# Patient Record
Sex: MALE | Race: WHITE | HISPANIC OR LATINO | Employment: FULL TIME | URBAN - METROPOLITAN AREA
[De-identification: names, ages, dates, MRNs, and addresses within clinical notes are randomized per-mention and may not be internally consistent; named-entity substitution may affect disease eponyms.]

---

## 2020-02-06 ENCOUNTER — APPOINTMENT (EMERGENCY)
Dept: CT IMAGING | Facility: HOSPITAL | Age: 50
End: 2020-02-06
Payer: COMMERCIAL

## 2020-02-06 ENCOUNTER — HOSPITAL ENCOUNTER (EMERGENCY)
Facility: HOSPITAL | Age: 50
Discharge: HOME | End: 2020-02-06
Attending: EMERGENCY MEDICINE | Admitting: EMERGENCY MEDICINE
Payer: COMMERCIAL

## 2020-02-06 VITALS
HEART RATE: 57 BPM | OXYGEN SATURATION: 98 % | SYSTOLIC BLOOD PRESSURE: 141 MMHG | RESPIRATION RATE: 16 BRPM | WEIGHT: 190 LBS | BODY MASS INDEX: 28.79 KG/M2 | HEIGHT: 68 IN | TEMPERATURE: 98.5 F | DIASTOLIC BLOOD PRESSURE: 72 MMHG

## 2020-02-06 DIAGNOSIS — K52.9 COLITIS: ICD-10-CM

## 2020-02-06 DIAGNOSIS — R10.9 NONSPECIFIC ABDOMINAL PAIN: Primary | ICD-10-CM

## 2020-02-06 LAB
ALBUMIN SERPL BCP-MCNC: 4 G/DL (ref 3.5–5)
ALP SERPL-CCNC: 81 U/L (ref 46–116)
ALT SERPL W P-5'-P-CCNC: 22 U/L (ref 12–78)
ANION GAP SERPL CALCULATED.3IONS-SCNC: 9 MMOL/L (ref 4–13)
AST SERPL W P-5'-P-CCNC: 14 U/L (ref 5–45)
BASOPHILS # BLD AUTO: 0.02 THOUSANDS/ΜL (ref 0–0.1)
BASOPHILS NFR BLD AUTO: 0 % (ref 0–1)
BILIRUB DIRECT SERPL-MCNC: 0.11 MG/DL (ref 0–0.2)
BILIRUB SERPL-MCNC: 0.6 MG/DL (ref 0.2–1)
BUN SERPL-MCNC: 14 MG/DL (ref 5–25)
CALCIUM SERPL-MCNC: 9.5 MG/DL (ref 8.3–10.1)
CHLORIDE SERPL-SCNC: 101 MMOL/L (ref 100–108)
CO2 SERPL-SCNC: 27 MMOL/L (ref 21–32)
CREAT SERPL-MCNC: 1.09 MG/DL (ref 0.6–1.3)
EOSINOPHIL # BLD AUTO: 0.23 THOUSAND/ΜL (ref 0–0.61)
EOSINOPHIL NFR BLD AUTO: 3 % (ref 0–6)
ERYTHROCYTE [DISTWIDTH] IN BLOOD BY AUTOMATED COUNT: 11.9 % (ref 11.6–15.1)
GFR SERPL CREATININE-BSD FRML MDRD: 78 ML/MIN/1.73SQ M
GLUCOSE SERPL-MCNC: 88 MG/DL (ref 65–140)
HCT VFR BLD AUTO: 48.5 % (ref 36.5–49.3)
HGB BLD-MCNC: 16.3 G/DL (ref 12–17)
IMM GRANULOCYTES # BLD AUTO: 0.01 THOUSAND/UL (ref 0–0.2)
IMM GRANULOCYTES NFR BLD AUTO: 0 % (ref 0–2)
LIPASE SERPL-CCNC: 310 U/L (ref 73–393)
LYMPHOCYTES # BLD AUTO: 2.5 THOUSANDS/ΜL (ref 0.6–4.47)
LYMPHOCYTES NFR BLD AUTO: 36 % (ref 14–44)
MCH RBC QN AUTO: 31.8 PG (ref 26.8–34.3)
MCHC RBC AUTO-ENTMCNC: 33.6 G/DL (ref 31.4–37.4)
MCV RBC AUTO: 95 FL (ref 82–98)
MONOCYTES # BLD AUTO: 0.76 THOUSAND/ΜL (ref 0.17–1.22)
MONOCYTES NFR BLD AUTO: 11 % (ref 4–12)
NEUTROPHILS # BLD AUTO: 3.34 THOUSANDS/ΜL (ref 1.85–7.62)
NEUTS SEG NFR BLD AUTO: 50 % (ref 43–75)
NRBC BLD AUTO-RTO: 0 /100 WBCS
PLATELET # BLD AUTO: 251 THOUSANDS/UL (ref 149–390)
PMV BLD AUTO: 10.6 FL (ref 8.9–12.7)
POTASSIUM SERPL-SCNC: 4.3 MMOL/L (ref 3.5–5.3)
PROT SERPL-MCNC: 8.3 G/DL (ref 6.4–8.2)
RBC # BLD AUTO: 5.13 MILLION/UL (ref 3.88–5.62)
SODIUM SERPL-SCNC: 137 MMOL/L (ref 136–145)
TROPONIN I SERPL-MCNC: <0.02 NG/ML
WBC # BLD AUTO: 6.86 THOUSAND/UL (ref 4.31–10.16)

## 2020-02-06 PROCEDURE — 80076 HEPATIC FUNCTION PANEL: CPT | Performed by: EMERGENCY MEDICINE

## 2020-02-06 PROCEDURE — 83690 ASSAY OF LIPASE: CPT | Performed by: EMERGENCY MEDICINE

## 2020-02-06 PROCEDURE — 74177 CT ABD & PELVIS W/CONTRAST: CPT

## 2020-02-06 PROCEDURE — 93005 ELECTROCARDIOGRAM TRACING: CPT

## 2020-02-06 PROCEDURE — 99285 EMERGENCY DEPT VISIT HI MDM: CPT | Performed by: EMERGENCY MEDICINE

## 2020-02-06 PROCEDURE — 80048 BASIC METABOLIC PNL TOTAL CA: CPT | Performed by: EMERGENCY MEDICINE

## 2020-02-06 PROCEDURE — 84484 ASSAY OF TROPONIN QUANT: CPT | Performed by: EMERGENCY MEDICINE

## 2020-02-06 PROCEDURE — 36415 COLL VENOUS BLD VENIPUNCTURE: CPT | Performed by: EMERGENCY MEDICINE

## 2020-02-06 PROCEDURE — 99284 EMERGENCY DEPT VISIT MOD MDM: CPT

## 2020-02-06 PROCEDURE — 85025 COMPLETE CBC W/AUTO DIFF WBC: CPT | Performed by: EMERGENCY MEDICINE

## 2020-02-06 PROCEDURE — C9113 INJ PANTOPRAZOLE SODIUM, VIA: HCPCS | Performed by: EMERGENCY MEDICINE

## 2020-02-06 PROCEDURE — 96374 THER/PROPH/DIAG INJ IV PUSH: CPT

## 2020-02-06 RX ORDER — SUCRALFATE ORAL 1 G/10ML
1000 SUSPENSION ORAL ONCE
Status: COMPLETED | OUTPATIENT
Start: 2020-02-06 | End: 2020-02-06

## 2020-02-06 RX ORDER — DICYCLOMINE HCL 20 MG
20 TABLET ORAL 2 TIMES DAILY
Qty: 30 TABLET | Refills: 0 | Status: SHIPPED | OUTPATIENT
Start: 2020-02-06 | End: 2020-02-22 | Stop reason: SDUPTHER

## 2020-02-06 RX ORDER — PANTOPRAZOLE SODIUM 40 MG/1
40 INJECTION, POWDER, FOR SOLUTION INTRAVENOUS ONCE
Status: COMPLETED | OUTPATIENT
Start: 2020-02-06 | End: 2020-02-06

## 2020-02-06 RX ADMIN — PANTOPRAZOLE SODIUM 40 MG: 40 INJECTION, POWDER, FOR SOLUTION INTRAVENOUS at 13:29

## 2020-02-06 RX ADMIN — SUCRALFATE 1000 MG: 1 SUSPENSION ORAL at 13:29

## 2020-02-06 RX ADMIN — IOHEXOL 100 ML: 350 INJECTION, SOLUTION INTRAVENOUS at 14:18

## 2020-02-06 NOTE — DISCHARGE INSTRUCTIONS
[place] diet  Bentyl twice daily to reduce your pain  Increase liquids  Up with Gastroenterology for further evaluation, or family provider

## 2020-02-06 NOTE — ED PROVIDER NOTES
History  Chief Complaint   Patient presents with    Abdominal Pain     central abdominal pain for several months; constant pain with nausea; denies vomiting; occasioanl diarrhea;     HPI  Patient is a 25-year-old male, history of gastric reflux and hiatal hernia, reports previous epigastric abdominal pain which seemed bother him for several years  Patient reports he was on medications like Prilosec to reduce stomach acid  He reports they really did not work  He apparently was referred to a specialist but never went  Patient reports the pain seems improved an hour last several months has come back  Patient describes an uncomfortable pressure like fullness in his epigastric region which somewhat radiates up towards his chest   He reports nausea with it  Reports the pain is much worse with laying back  He denies any vomiting but sometimes feels somewhat nauseated with it like he is going to vomit  He reports intermittent diarrhea with this pain in the past   He reports the pain is very intermittent med seems to come ago  Past medical history similar abdominal pain a epigastric in the past  Family history noncontributory  Social history nonsmoker no history of drug abuse  None       History reviewed  No pertinent past medical history  History reviewed  No pertinent surgical history  History reviewed  No pertinent family history  I have reviewed and agree with the history as documented  Social History     Tobacco Use    Smoking status: Never Smoker    Smokeless tobacco: Never Used   Substance Use Topics    Alcohol use: Not Currently    Drug use: Not on file        Review of Systems   Constitutional: Negative for diaphoresis, fatigue and fever  HENT: Negative for congestion, ear pain, nosebleeds and sore throat  Eyes: Negative for photophobia, pain, discharge and visual disturbance  Respiratory: Negative for cough, choking, chest tightness, shortness of breath and wheezing  Cardiovascular: Negative for chest pain and palpitations  Gastrointestinal: Positive for abdominal pain and nausea  Negative for abdominal distention, diarrhea and vomiting  Genitourinary: Negative for dysuria, flank pain and frequency  Musculoskeletal: Negative for back pain, gait problem and joint swelling  Skin: Negative for color change and rash  Neurological: Negative for dizziness, syncope and headaches  Psychiatric/Behavioral: Negative for behavioral problems and confusion  The patient is not nervous/anxious  All other systems reviewed and are negative  Physical Exam  Physical Exam   Constitutional: He is oriented to person, place, and time  He appears well-developed and well-nourished  HENT:   Head: Normocephalic  Right Ear: External ear normal    Left Ear: External ear normal    Nose: Nose normal    Mouth/Throat: Oropharynx is clear and moist    Eyes: Pupils are equal, round, and reactive to light  EOM and lids are normal    Neck: Normal range of motion  Neck supple  Cardiovascular: Normal rate, regular rhythm, normal heart sounds and intact distal pulses  Pulmonary/Chest: Effort normal and breath sounds normal  No respiratory distress  Abdominal: Soft  Bowel sounds are normal  There is tenderness  There is mild epigastric tenderness without rebound or guarding   Musculoskeletal: Normal range of motion  He exhibits no deformity  Neurological: He is alert and oriented to person, place, and time  Skin: Skin is warm and dry  Psychiatric: He has a normal mood and affect  Nursing note and vitals reviewed    Pulse oximetry normal at 100% adequate oxygenation, there is no hypoxia    Vital Signs  ED Triage Vitals [02/06/20 1314]   Temperature Pulse Respirations Blood Pressure SpO2   98 5 °F (36 9 °C) 59 12 141/72 100 %      Temp Source Heart Rate Source Patient Position - Orthostatic VS BP Location FiO2 (%)   Oral Monitor Sitting Left arm --      Pain Score       7 Vitals:    02/06/20 1400 02/06/20 1415 02/06/20 1430 02/06/20 1445   BP:       Pulse: 57 55 57 57   Patient Position - Orthostatic VS:             Visual Acuity      ED Medications  Medications   sucralfate (CARAFATE) oral suspension 1,000 mg (1,000 mg Oral Given 2/6/20 1329)   pantoprazole (PROTONIX) injection 40 mg (40 mg Intravenous Given 2/6/20 1329)   iohexol (OMNIPAQUE) 350 MG/ML injection (MULTI-DOSE) 100 mL (100 mL Intravenous Given 2/6/20 1418)       Diagnostic Studies  Results Reviewed     Procedure Component Value Units Date/Time    Troponin I [[id number]]  (Normal) Collected:  02/06/20 1327    Lab Status:  Final result Specimen:  Blood from Arm, Right Updated:  02/06/20 1409     Troponin I <0 02 ng/mL     Basic metabolic panel [[id number]] Collected:  02/06/20 1327    Lab Status:  Final result Specimen:  Blood from Arm, Right Updated:  02/06/20 1402     Sodium 137 mmol/L      Potassium 4 3 mmol/L      Chloride 101 mmol/L      CO2 27 mmol/L      ANION GAP 9 mmol/L      BUN 14 mg/dL      Creatinine 1 09 mg/dL      Glucose 88 mg/dL      Calcium 9 5 mg/dL      eGFR 78 ml/min/1 73sq m     Narrative:       [place] guidelines for Chronic Kidney Disease (CKD):     Stage 1 with normal or high GFR (GFR > 90 mL/min/1 73 square meters)    Stage 2 Mild CKD (GFR = 60-89 mL/min/1 73 square meters)    Stage 3A Moderate CKD (GFR = 45-59 mL/min/1 73 square meters)    Stage 3B Moderate CKD (GFR = 30-44 mL/min/1 73 square meters)    Stage 4 Severe CKD (GFR = 15-29 mL/min/1 73 square meters)    Stage 5 End Stage CKD (GFR <15 mL/min/1 73 square meters)  Note: GFR calculation is accurate only with a steady state creatinine    Hepatic function panel [[id number]]  (Abnormal) Collected:  02/06/20 1327    Lab Status:  Final result Specimen:  Blood from Arm, Right Updated:  02/06/20 1402     Total Bilirubin 0 60 mg/dL      Bilirubin, Direct 0 11 mg/dL      Alkaline Phosphatase 81 U/L      AST 14 U/L      ALT 22 U/L      Total Protein 8 3 g/dL      Albumin 4 0 g/dL     Lipase [[id number]]  (Normal) Collected:  02/06/20 1327    Lab Status:  Final result Specimen:  Blood from Arm, Right Updated:  02/06/20 1402     Lipase 310 u/L     CBC and differential [[id number]] Collected:  02/06/20 1327    Lab Status:  Final result Specimen:  Blood from Arm, Right Updated:  02/06/20 1333     WBC 6 86 Thousand/uL      RBC 5 13 Million/uL      Hemoglobin 16 3 g/dL      Hematocrit 48 5 %      MCV 95 fL      MCH 31 8 pg      MCHC 33 6 g/dL      RDW 11 9 %      MPV 10 6 fL      Platelets 132 Thousands/uL      nRBC 0 /100 WBCs      Neutrophils Relative 50 %      Immat GRANS % 0 %      Lymphocytes Relative 36 %      Monocytes Relative 11 %      Eosinophils Relative 3 %      Basophils Relative 0 %      Neutrophils Absolute 3 34 Thousands/µL      Immature Grans Absolute 0 01 Thousand/uL      Lymphocytes Absolute 2 50 Thousands/µL      Monocytes Absolute 0 76 Thousand/µL      Eosinophils Absolute 0 23 Thousand/µL      Basophils Absolute 0 02 Thousands/µL                  CT abdomen pelvis with contrast   Final Result by Patti Cline MD (02/06 1506)      Focal thickening of the cecum and proximal ascending colon more likely on the basis of a segmental colitis than a colonic mass  The study was marked in [facility] for immediate notification            Workstation performed: [id number]                    Procedures  ECG 12 Lead Documentation Only  Date/Time: 2/6/2020 1:41 PM  Performed by: Lady Shakira MD  Authorized by: Lady Shakira MD     Indications / Diagnosis:  Epigastric pain  ECG reviewed by me, the ED Provider: yes    Patient location:  ED  Previous ECG:     Previous ECG:  Unavailable  Interpretation:     Interpretation: non-specific    Rate:     ECG rate:  Fifty-six    ECG rate assessment: normal    Rhythm:     Rhythm: sinus rhythm    Comments:      Normal sinus rhythm no acute ST-T wave changes             ED Course the patient's pain radiated up into his chest soon EKG and cardiac troponin were done they were negative, liver functions were normal no sign of hepatitis, lipase was normal no sign of pancreatitis  Electrolytes within normal limits with a normal BUN creatinine no sign of renal dysfunction  white count was normal at 6 8 no sign of inflammation, hemoglobin was normal at 16 no sign of anemia  CT scan showed some focal thickening of the cecum, abnormal CT report, gave patient a copy, more consistent with colitis then abdominal mass                        [facility] decision-making: based on my review of the history, physical exam, laboratory testing and diagnostic CT scan; I believe the patient has Nonspecific abdominal pain, possible colitis at this time  No indication for further evaluation  No indication for hospitalization  The patient will follow-up as an outpatient  Discussed the CT scan with patient, discussed abnormal findings, discussed with patient may require colonoscopy for abnormal CT scan with possible colitis versus colonic mass  Patient understands  We discussed indications to return such as increasing pain bleeding or any issue  Disposition  Final diagnoses:   Nonspecific abdominal pain   Colitis     Time reflects when diagnosis was documented in both MDM as applicable and the Disposition within this note     Time User Action Codes Description Comment    2/6/2020  3:20 PM Shauna Gutierrez Add [R10 9] Nonspecific abdominal pain     2/6/2020  3:20 PM Shauna Gutierrez Add [K52 9] Colitis       ED Disposition     ED Disposition Condition Date/Time Comment    Discharge Stable u Feb 6, 2020  3:20 PM Faisal Paterson discharge to home/self care              Follow-up Information     Follow up With Specialties Details Why Contact Info    Bernardino Day MD Gastroenterology   [address]  [address]  [facility] [phone]  [facility], DO Family Medicine   [address] [facility]            Discharge Medication List as of 2/6/2020  3:22 PM      START taking these medications    Details   dicyclomine (BENTYL) 20 mg tablet Take 1 tablet (20 mg total) by mouth 2 (two) times a day for 30 doses, Starting Thu 2/6/2020, Until Fri 2/21/2020, Print           No discharge procedures on file      ED Provider  Electronically Signed by           Butch Blanton MD  02/06/20 2033

## 2020-02-07 LAB
ATRIAL RATE: 56 BPM
P AXIS: 47 DEGREES
PR INTERVAL: 140 MS
QRS AXIS: 49 DEGREES
QRSD INTERVAL: 78 MS
QT INTERVAL: 380 MS
QTC INTERVAL: 366 MS
T WAVE AXIS: 32 DEGREES
VENTRICULAR RATE: 56 BPM

## 2020-02-07 PROCEDURE — 93010 ELECTROCARDIOGRAM REPORT: CPT | Performed by: INTERNAL MEDICINE

## 2020-02-22 ENCOUNTER — TELEPHONE (OUTPATIENT)
Dept: INTERNAL MEDICINE CLINIC | Facility: CLINIC | Age: 50
End: 2020-02-22

## 2020-02-22 ENCOUNTER — OFFICE VISIT (OUTPATIENT)
Dept: INTERNAL MEDICINE CLINIC | Facility: CLINIC | Age: 50
End: 2020-02-22
Payer: COMMERCIAL

## 2020-02-22 VITALS
WEIGHT: 190 LBS | HEART RATE: 68 BPM | OXYGEN SATURATION: 98 % | SYSTOLIC BLOOD PRESSURE: 140 MMHG | DIASTOLIC BLOOD PRESSURE: 68 MMHG | BODY MASS INDEX: 28.79 KG/M2 | HEIGHT: 68 IN

## 2020-02-22 DIAGNOSIS — K52.9 COLITIS: ICD-10-CM

## 2020-02-22 PROCEDURE — 99203 OFFICE O/P NEW LOW 30 MIN: CPT | Performed by: INTERNAL MEDICINE

## 2020-02-22 PROCEDURE — 3008F BODY MASS INDEX DOCD: CPT | Performed by: INTERNAL MEDICINE

## 2020-02-22 PROCEDURE — 1036F TOBACCO NON-USER: CPT | Performed by: INTERNAL MEDICINE

## 2020-02-22 PROCEDURE — 3008F BODY MASS INDEX DOCD: CPT | Performed by: PHYSICIAN ASSISTANT

## 2020-02-22 RX ORDER — DICYCLOMINE HCL 20 MG
20 TABLET ORAL 2 TIMES DAILY
Qty: 60 TABLET | Refills: 1 | Status: SHIPPED | OUTPATIENT
Start: 2020-02-22 | End: 2020-04-13

## 2020-02-22 NOTE — PROGRESS NOTES
BMI Counseling: Body mass index is 28 89 kg/m²  Follow-up plan was not completed due to patient being in urgent or emergent medical situation

## 2020-02-22 NOTE — PROGRESS NOTES
Assessment/Plan:       Diagnoses and all orders for this visit:    Colitis  -     dicyclomine (BENTYL) 20 mg tablet; Take 1 tablet (20 mg total) by mouth 2 (two) times a day for 30 doses  -     Ambulatory referral to Gastroenterology; Future                Subjective:      Patient ID: Tamiko Reynoso is a 45 y o  male  Abdominal pain  A 13-year-old male with a 4 month history of epigastric abdominal pain  This is felt as a dull pain  He put up with this pain for 4 months then went to the emergency room on February 6  There he was examined and had laboratory testing and a CT scan  Abnormalities on laboratory testing were only slightly elevated protein  LFTs and lipase were normal   CT scanning documented inflammatory changes in the ascending colon and cecal area but nothing in the mid epigastrium    He was given Bentyl and when he takes Bentyl pain goes away but if he does not take it recurs    Along with this, he has developed a change in bowel habit  Occasionally, perhaps once every 2 weeks, he will have an episode of loose diarrhea  Otherwise stools are normal   No bleeding  He has lost 10 lb in this time  No fever, no chills, and no sweats  In the past, he has had fairly typical heartburn with subxiphoid burning sensation alleviated by antacid medication but this is different  No cigarettes and no alcohol  Works in a factory in [employer]    Patient states his mother has problems in the stomach but he is unable to better describe them  Otherwise, he is healthy  No ongoing diagnoses  No prior surgeries  Currently sexually active; lifetime number sex partners 6 all women  No STD history  No recent foreign travel  There is no history of any suspicious food intake; none of his acquaintances or friends have developed this       The following portions of the patient's history were reviewed and updated as appropriate:   He has no past medical history on file  ,  does not have any pertinent problems on file  ,   has no past surgical history on file  ,  family history is not on file  ,   reports that he has never smoked  He has never used smokeless tobacco  He reports that he drank alcohol  His drug history is not on file  ,  has No Known Allergies     Current Outpatient Medications   Medication Sig Dispense Refill    dicyclomine (BENTYL) 20 mg tablet Take 1 tablet (20 mg total) by mouth 2 (two) times a day for 30 doses 60 tablet 1     No current facility-administered medications for this visit  Review of Systems   Gastrointestinal: Positive for abdominal pain and diarrhea  All other systems reviewed and are negative  Objective:  Vitals:    02/22/20 0809   BP: 140/68   Pulse: 68   SpO2: 98%      Physical Exam   Constitutional: He is oriented to person, place, and time  He appears well-developed and well-nourished  HENT:   Head: Normocephalic and atraumatic  Eyes: Pupils are equal, round, and reactive to light  Conjunctivae are normal    Neck: Normal range of motion  Neck supple  No tracheal deviation present  No thyromegaly present  Cardiovascular: Normal rate, regular rhythm and normal heart sounds  Exam reveals no gallop  No murmur heard  Pulmonary/Chest: Effort normal  No respiratory distress  He has no wheezes  He has no rales  Abdominal: Soft  Bowel sounds are normal  There is no tenderness  Genitourinary: Penis normal  Right testis shows no mass and no tenderness  Left testis shows no mass and no tenderness  Musculoskeletal: Normal range of motion  He exhibits no tenderness or deformity  Lymphadenopathy:     He has no cervical adenopathy  Neurological: He is alert and oriented to person, place, and time  He has normal reflexes  Coordination normal    Skin: Skin is warm and dry  Psychiatric: He has a normal mood and affect   His behavior is normal  Judgment and thought content normal          Patient Instructions    A patient with abdominal pain and an abnormal CT suggesting colitis   to continue Bentyl and to be referred to Gastroenterology

## 2020-03-03 ENCOUNTER — OFFICE VISIT (OUTPATIENT)
Dept: GASTROENTEROLOGY | Facility: CLINIC | Age: 50
End: 2020-03-03
Payer: COMMERCIAL

## 2020-03-03 VITALS
WEIGHT: 189 LBS | DIASTOLIC BLOOD PRESSURE: 82 MMHG | HEART RATE: 80 BPM | BODY MASS INDEX: 28.74 KG/M2 | SYSTOLIC BLOOD PRESSURE: 130 MMHG

## 2020-03-03 DIAGNOSIS — R10.13 EPIGASTRIC PAIN: Primary | ICD-10-CM

## 2020-03-03 DIAGNOSIS — R63.4 WEIGHT LOSS: ICD-10-CM

## 2020-03-03 DIAGNOSIS — R19.7 DIARRHEA, UNSPECIFIED TYPE: ICD-10-CM

## 2020-03-03 DIAGNOSIS — R11.2 NAUSEA AND VOMITING, INTRACTABILITY OF VOMITING NOT SPECIFIED, UNSPECIFIED VOMITING TYPE: ICD-10-CM

## 2020-03-03 PROCEDURE — 99203 OFFICE O/P NEW LOW 30 MIN: CPT | Performed by: PHYSICIAN ASSISTANT

## 2020-03-03 PROCEDURE — 1036F TOBACCO NON-USER: CPT | Performed by: PHYSICIAN ASSISTANT

## 2020-03-03 NOTE — PROGRESS NOTES
[facility] - Outpatient Consultation  Sagar Archer 45 y o  male MRN: [id number]  Encounter: [id number]          ASSESSMENT AND PLAN:      1  Epigastric pain  2  Nausea and vomiting  3  Diarrhea  4  Weight loss    Patient presents for an evaluation of epigastric abdominal pain with intermittent nausea and diarrhea x 3 months  He had a CT A/P with IV contrast which showed a normal gallbladder but suggested focal thickening of the cecum and ascending colon suggestive of a colitis  No relief of the pain with Prevacid  He does report improvement with the Bentyl  He reports he has lost 10-15 lbs  EGD and colonoscopy to investigate - evaluate for Crohn's/UC, ulcers, malignancy, etc     Follow up in office in 3-4 weeks  ______________________________________________________________________    HPI:  Patient is a 45year-old male who presents to the office for a consultation regarding abdominal pain  Patient reports that for the past 3 months he has been experiencing epigastric abdominal pain  He describes the pain as dull in nature  He tried Prevacid without relief  He was given Bentyl which does seem to help alleviate the pain temporarily  He also reports associated nausea and diarrhea  He reports diarrhea intermittently a couple times a week (other days he has normal BMs)  He reports intermittent N/V more often in the middle of the night  He denies any rectal bleeding or melena  He had a CT Scan A/P with IV contrast which suggested focal thickening of the cecum and ascending colon suggestive of a colitis  He has never had an EGD or colonoscopy  He does have a history of GERD but denies frequent heartburn at this time  No family history of ulcerative colitis, crohn's disease, or colon cancer  He reports his mother had ulcers  He reports he has lost 10-15 lbs since the pain started  REVIEW OF SYSTEMS:    CONSTITUTIONAL: Denies any fever, chills, rigors   + weight loss   HEENT: No earache or tinnitus  Denies hearing loss or visual disturbances  CARDIOVASCULAR: No chest pain or palpitations  RESPIRATORY: Denies any cough, hemoptysis, shortness of breath or dyspnea on exertion  GASTROINTESTINAL: As noted in the History of Present Illness  GENITOURINARY: No problems with urination  Denies any hematuria or dysuria  NEUROLOGIC: No dizziness or vertigo, denies headaches  MUSCULOSKELETAL: Denies any muscle or joint pain  SKIN: Denies skin rashes or itching  ENDOCRINE: Denies excessive thirst  Denies intolerance to heat or cold  PSYCHOSOCIAL: Denies depression or anxiety  Denies any recent memory loss  Historical Information   History reviewed  No pertinent past medical history  History reviewed  No pertinent surgical history  Social History   Social History     Substance and Sexual Activity   Alcohol Use Not Currently     Social History     Substance and Sexual Activity   Drug Use Never     Social History     Tobacco Use   Smoking Status Never Smoker   Smokeless Tobacco Never Used     History reviewed  No pertinent family history  Meds/Allergies       Current Outpatient Medications:     dicyclomine (BENTYL) 20 mg tablet    No Known Allergies        Objective     Blood pressure 130/82, pulse 80, weight 85 7 kg (189 lb)  Body mass index is 28 74 kg/m²  PHYSICAL EXAM:      General Appearance:   Alert, cooperative, no distress   HEENT:   Normocephalic, atraumatic, anicteric      Neck:  Supple, symmetrical, trachea midline   Lungs:   Clear to auscultation bilaterally; no rales, rhonchi or wheezing; respirations unlabored    Heart[de-identified]   Regular rate and rhythm; no murmur, rub, or gallop     Abdomen:   Soft, non-tender, non-distended; normal bowel sounds; no masses, no organomegaly    Genitalia:   Deferred    Rectal:   Deferred    Extremities:  No cyanosis, clubbing or edema    Pulses:  2+ and symmetric    Skin:  No jaundice, rashes, or lesions    Lymph nodes:  No palpable cervical lymphadenopathy        Lab Results:   No visits with results within 1 Day(s) from this visit     Latest known visit with results is:   Admission on 02/06/2020, Discharged on 02/06/2020   Component Date Value    WBC 02/06/2020 6 86     RBC 02/06/2020 5 13     Hemoglobin 02/06/2020 16 3     Hematocrit 02/06/2020 48 5     MCV 02/06/2020 95     MCH 02/06/2020 31 8     MCHC 02/06/2020 33 6     RDW 02/06/2020 11 9     MPV 02/06/2020 10 6     Platelets 56/28/4513 251     nRBC 02/06/2020 0     Neutrophils Relative 02/06/2020 50     Immat GRANS % 02/06/2020 0     Lymphocytes Relative 02/06/2020 36     Monocytes Relative 02/06/2020 11     Eosinophils Relative 02/06/2020 3     Basophils Relative 02/06/2020 0     Neutrophils Absolute 02/06/2020 3 34     Immature Grans Absolute 02/06/2020 0 01     Lymphocytes Absolute 02/06/2020 2 50     Monocytes Absolute 02/06/2020 0 76     Eosinophils Absolute 02/06/2020 0 23     Basophils Absolute 02/06/2020 0 02     Sodium 02/06/2020 137     Potassium 02/06/2020 4 3     Chloride 02/06/2020 101     CO2 02/06/2020 27     ANION GAP 02/06/2020 9     BUN 02/06/2020 14     Creatinine 02/06/2020 1 09     Glucose 02/06/2020 88     Calcium 02/06/2020 9 5     eGFR 02/06/2020 78     Troponin I 02/06/2020 <0 02     Total Bilirubin 02/06/2020 0 60     Bilirubin, Direct 02/06/2020 0 11     Alkaline Phosphatase 02/06/2020 81     AST 02/06/2020 14     ALT 02/06/2020 22     Total Protein 02/06/2020 8 3*    Albumin 02/06/2020 4 0     Lipase 02/06/2020 310     Ventricular Rate 02/06/2020 56     Atrial Rate 02/06/2020 56     FL Interval 02/06/2020 140     QRSD Interval 02/06/2020 78     QT Interval 02/06/2020 380     QTC Interval 02/06/2020 366     P Axis 02/06/2020 47     QRS Axis 02/06/2020 49     T Wave Axis 02/06/2020 32          Radiology Results:   Ct Abdomen Pelvis With Contrast    Result Date: 2/6/2020  Narrative: CT ABDOMEN AND PELVIS WITH IV CONTRAST INDICATION:   Epigastric pain  Impression COMPARISON:  None  TECHNIQUE:  CT examination of the abdomen and pelvis was performed  Axial, sagittal, and coronal 2D reformatted images were created from the source data and submitted for interpretation  Radiation dose length product (DLP) for this visit:  452 mGy-cm   This examination, like all CT scans performed in the [facility], was performed utilizing techniques to minimize radiation dose exposure, including the use of iterative reconstruction and automated exposure control  IV Contrast:  100 mL of iohexol (OMNIPAQUE) Enteric Contrast:  Enteric contrast was not administered  FINDINGS: ABDOMEN LOWER CHEST:  No clinically significant abnormality identified in the visualized lower chest  LIVER/BILIARY TREE:  Unremarkable  GALLBLADDER:  No calcified gallstones  No pericholecystic inflammatory change  SPLEEN:  Unremarkable  PANCREAS:  Unremarkable  ADRENAL GLANDS:  Unremarkable  KIDNEYS/URETERS:  Unremarkable  No hydronephrosis  STOMACH AND BOWEL:  Focal thickening of the cecum and proximal ascending colon more likely on the basis of a segmental colitis than a colonic mass  No bowel obstruction  APPENDIX:  No findings to suggest appendicitis  ABDOMINOPELVIC CAVITY:  No ascites or free intraperitoneal air  No lymphadenopathy  VESSELS:  Unremarkable for patient's age  PELVIS REPRODUCTIVE ORGANS:  Unremarkable for patient's age  URINARY BLADDER:  Unremarkable  ABDOMINAL WALL/INGUINAL REGIONS:  Unremarkable  OSSEOUS STRUCTURES:  No acute fracture or destructive osseous lesion  Impression: Focal thickening of the cecum and proximal ascending colon more likely on the basis of a segmental colitis than a colonic mass  The study was marked in [facility] for immediate notification   Workstation performed: [id number]

## 2020-03-03 NOTE — H&P (VIEW-ONLY)
[facility] - Outpatient Consultation  Marnie Lynn 45 y o  male MRN: [id number]  Encounter: [id number]          ASSESSMENT AND PLAN:      1  Epigastric pain  2  Nausea and vomiting  3  Diarrhea  4  Weight loss    Patient presents for an evaluation of epigastric abdominal pain with intermittent nausea and diarrhea x 3 months  He had a CT A/P with IV contrast which showed a normal gallbladder but suggested focal thickening of the cecum and ascending colon suggestive of a colitis  No relief of the pain with Prevacid  He does report improvement with the Bentyl  He reports he has lost 10-15 lbs  EGD and colonoscopy to investigate - evaluate for Crohn's/UC, ulcers, malignancy, etc     Follow up in office in 3-4 weeks  ______________________________________________________________________    HPI:  Patient is a 45year-old male who presents to the office for a consultation regarding abdominal pain  Patient reports that for the past 3 months he has been experiencing epigastric abdominal pain  He describes the pain as dull in nature  He tried Prevacid without relief  He was given Bentyl which does seem to help alleviate the pain temporarily  He also reports associated nausea and diarrhea  He reports diarrhea intermittently a couple times a week (other days he has normal BMs)  He reports intermittent N/V more often in the middle of the night  He denies any rectal bleeding or melena  He had a CT Scan A/P with IV contrast which suggested focal thickening of the cecum and ascending colon suggestive of a colitis  He has never had an EGD or colonoscopy  He does have a history of GERD but denies frequent heartburn at this time  No family history of ulcerative colitis, crohn's disease, or colon cancer  He reports his mother had ulcers  He reports he has lost 10-15 lbs since the pain started  REVIEW OF SYSTEMS:    CONSTITUTIONAL: Denies any fever, chills, rigors   + weight loss   HEENT: No earache or tinnitus  Denies hearing loss or visual disturbances  CARDIOVASCULAR: No chest pain or palpitations  RESPIRATORY: Denies any cough, hemoptysis, shortness of breath or dyspnea on exertion  GASTROINTESTINAL: As noted in the History of Present Illness  GENITOURINARY: No problems with urination  Denies any hematuria or dysuria  NEUROLOGIC: No dizziness or vertigo, denies headaches  MUSCULOSKELETAL: Denies any muscle or joint pain  SKIN: Denies skin rashes or itching  ENDOCRINE: Denies excessive thirst  Denies intolerance to heat or cold  PSYCHOSOCIAL: Denies depression or anxiety  Denies any recent memory loss  Historical Information   History reviewed  No pertinent past medical history  History reviewed  No pertinent surgical history  Social History   Social History     Substance and Sexual Activity   Alcohol Use Not Currently     Social History     Substance and Sexual Activity   Drug Use Never     Social History     Tobacco Use   Smoking Status Never Smoker   Smokeless Tobacco Never Used     History reviewed  No pertinent family history  Meds/Allergies       Current Outpatient Medications:     dicyclomine (BENTYL) 20 mg tablet    No Known Allergies        Objective     Blood pressure 130/82, pulse 80, weight 85 7 kg (189 lb)  Body mass index is 28 74 kg/m²  PHYSICAL EXAM:      General Appearance:   Alert, cooperative, no distress   HEENT:   Normocephalic, atraumatic, anicteric      Neck:  Supple, symmetrical, trachea midline   Lungs:   Clear to auscultation bilaterally; no rales, rhonchi or wheezing; respirations unlabored    Heart[de-identified]   Regular rate and rhythm; no murmur, rub, or gallop     Abdomen:   Soft, non-tender, non-distended; normal bowel sounds; no masses, no organomegaly    Genitalia:   Deferred    Rectal:   Deferred    Extremities:  No cyanosis, clubbing or edema    Pulses:  2+ and symmetric    Skin:  No jaundice, rashes, or lesions    Lymph nodes:  No palpable cervical lymphadenopathy        Lab Results:   No visits with results within 1 Day(s) from this visit     Latest known visit with results is:   Admission on 02/06/2020, Discharged on 02/06/2020   Component Date Value    WBC 02/06/2020 6 86     RBC 02/06/2020 5 13     Hemoglobin 02/06/2020 16 3     Hematocrit 02/06/2020 48 5     MCV 02/06/2020 95     MCH 02/06/2020 31 8     MCHC 02/06/2020 33 6     RDW 02/06/2020 11 9     MPV 02/06/2020 10 6     Platelets 02/18/5430 251     nRBC 02/06/2020 0     Neutrophils Relative 02/06/2020 50     Immat GRANS % 02/06/2020 0     Lymphocytes Relative 02/06/2020 36     Monocytes Relative 02/06/2020 11     Eosinophils Relative 02/06/2020 3     Basophils Relative 02/06/2020 0     Neutrophils Absolute 02/06/2020 3 34     Immature Grans Absolute 02/06/2020 0 01     Lymphocytes Absolute 02/06/2020 2 50     Monocytes Absolute 02/06/2020 0 76     Eosinophils Absolute 02/06/2020 0 23     Basophils Absolute 02/06/2020 0 02     Sodium 02/06/2020 137     Potassium 02/06/2020 4 3     Chloride 02/06/2020 101     CO2 02/06/2020 27     ANION GAP 02/06/2020 9     BUN 02/06/2020 14     Creatinine 02/06/2020 1 09     Glucose 02/06/2020 88     Calcium 02/06/2020 9 5     eGFR 02/06/2020 78     Troponin I 02/06/2020 <0 02     Total Bilirubin 02/06/2020 0 60     Bilirubin, Direct 02/06/2020 0 11     Alkaline Phosphatase 02/06/2020 81     AST 02/06/2020 14     ALT 02/06/2020 22     Total Protein 02/06/2020 8 3*    Albumin 02/06/2020 4 0     Lipase 02/06/2020 310     Ventricular Rate 02/06/2020 56     Atrial Rate 02/06/2020 56     WA Interval 02/06/2020 140     QRSD Interval 02/06/2020 78     QT Interval 02/06/2020 380     QTC Interval 02/06/2020 366     P Axis 02/06/2020 47     QRS Axis 02/06/2020 49     T Wave Axis 02/06/2020 32          Radiology Results:   Ct Abdomen Pelvis With Contrast    Result Date: 2/6/2020  Narrative: CT ABDOMEN AND PELVIS WITH IV CONTRAST INDICATION:   Epigastric pain  Impression COMPARISON:  None  TECHNIQUE:  CT examination of the abdomen and pelvis was performed  Axial, sagittal, and coronal 2D reformatted images were created from the source data and submitted for interpretation  Radiation dose length product (DLP) for this visit:  452 mGy-cm   This examination, like all CT scans performed in the [facility], was performed utilizing techniques to minimize radiation dose exposure, including the use of iterative reconstruction and automated exposure control  IV Contrast:  100 mL of iohexol (OMNIPAQUE) Enteric Contrast:  Enteric contrast was not administered  FINDINGS: ABDOMEN LOWER CHEST:  No clinically significant abnormality identified in the visualized lower chest  LIVER/BILIARY TREE:  Unremarkable  GALLBLADDER:  No calcified gallstones  No pericholecystic inflammatory change  SPLEEN:  Unremarkable  PANCREAS:  Unremarkable  ADRENAL GLANDS:  Unremarkable  KIDNEYS/URETERS:  Unremarkable  No hydronephrosis  STOMACH AND BOWEL:  Focal thickening of the cecum and proximal ascending colon more likely on the basis of a segmental colitis than a colonic mass  No bowel obstruction  APPENDIX:  No findings to suggest appendicitis  ABDOMINOPELVIC CAVITY:  No ascites or free intraperitoneal air  No lymphadenopathy  VESSELS:  Unremarkable for patient's age  PELVIS REPRODUCTIVE ORGANS:  Unremarkable for patient's age  URINARY BLADDER:  Unremarkable  ABDOMINAL WALL/INGUINAL REGIONS:  Unremarkable  OSSEOUS STRUCTURES:  No acute fracture or destructive osseous lesion  Impression: Focal thickening of the cecum and proximal ascending colon more likely on the basis of a segmental colitis than a colonic mass  The study was marked in [facility] for immediate notification   Workstation performed: [id number]

## 2020-03-16 ENCOUNTER — TELEPHONE (OUTPATIENT)
Dept: GASTROENTEROLOGY | Facility: CLINIC | Age: 50
End: 2020-03-16

## 2020-03-16 ENCOUNTER — HOSPITAL ENCOUNTER (OUTPATIENT)
Dept: GASTROENTEROLOGY | Facility: HOSPITAL | Age: 50
Setting detail: OUTPATIENT SURGERY
Discharge: HOME | End: 2020-03-16
Attending: INTERNAL MEDICINE | Admitting: INTERNAL MEDICINE
Payer: COMMERCIAL

## 2020-03-16 ENCOUNTER — ANESTHESIA (OUTPATIENT)
Dept: GASTROENTEROLOGY | Facility: HOSPITAL | Age: 50
End: 2020-03-16

## 2020-03-16 ENCOUNTER — ANESTHESIA EVENT (OUTPATIENT)
Dept: GASTROENTEROLOGY | Facility: HOSPITAL | Age: 50
End: 2020-03-16

## 2020-03-16 VITALS
HEIGHT: 68 IN | RESPIRATION RATE: 11 BRPM | DIASTOLIC BLOOD PRESSURE: 72 MMHG | SYSTOLIC BLOOD PRESSURE: 118 MMHG | BODY MASS INDEX: 28.5 KG/M2 | TEMPERATURE: 97.6 F | HEART RATE: 55 BPM | OXYGEN SATURATION: 99 % | WEIGHT: 188.05 LBS

## 2020-03-16 DIAGNOSIS — K29.00 OTHER ACUTE GASTRITIS WITHOUT HEMORRHAGE: Primary | ICD-10-CM

## 2020-03-16 DIAGNOSIS — R11.2 NAUSEA AND VOMITING, INTRACTABILITY OF VOMITING NOT SPECIFIED, UNSPECIFIED VOMITING TYPE: ICD-10-CM

## 2020-03-16 DIAGNOSIS — R19.7 DIARRHEA, UNSPECIFIED TYPE: ICD-10-CM

## 2020-03-16 DIAGNOSIS — R63.4 WEIGHT LOSS: ICD-10-CM

## 2020-03-16 DIAGNOSIS — R10.13 EPIGASTRIC PAIN: ICD-10-CM

## 2020-03-16 PROCEDURE — 45385 COLONOSCOPY W/LESION REMOVAL: CPT | Performed by: INTERNAL MEDICINE

## 2020-03-16 PROCEDURE — 88342 IMHCHEM/IMCYTCHM 1ST ANTB: CPT | Performed by: PATHOLOGY

## 2020-03-16 PROCEDURE — 88365 INSITU HYBRIDIZATION (FISH): CPT | Performed by: PATHOLOGY

## 2020-03-16 PROCEDURE — 88341 IMHCHEM/IMCYTCHM EA ADD ANTB: CPT | Performed by: PATHOLOGY

## 2020-03-16 PROCEDURE — 88368 INSITU HYBRIDIZATION MANUAL: CPT | Performed by: PATHOLOGY

## 2020-03-16 PROCEDURE — 88305 TISSUE EXAM BY PATHOLOGIST: CPT | Performed by: PATHOLOGY

## 2020-03-16 PROCEDURE — 43239 EGD BIOPSY SINGLE/MULTIPLE: CPT | Performed by: INTERNAL MEDICINE

## 2020-03-16 PROCEDURE — 88364 INSITU HYBRIDIZATION (FISH): CPT | Performed by: PATHOLOGY

## 2020-03-16 PROCEDURE — NC001 PR NO CHARGE: Performed by: INTERNAL MEDICINE

## 2020-03-16 PROCEDURE — 45380 COLONOSCOPY AND BIOPSY: CPT | Performed by: INTERNAL MEDICINE

## 2020-03-16 RX ORDER — SODIUM CHLORIDE, SODIUM LACTATE, POTASSIUM CHLORIDE, CALCIUM CHLORIDE 600; 310; 30; 20 MG/100ML; MG/100ML; MG/100ML; MG/100ML
125 INJECTION, SOLUTION INTRAVENOUS CONTINUOUS
Status: CANCELLED | OUTPATIENT
Start: 2020-03-16

## 2020-03-16 RX ORDER — LIDOCAINE HYDROCHLORIDE 10 MG/ML
INJECTION, SOLUTION EPIDURAL; INFILTRATION; INTRACAUDAL; PERINEURAL AS NEEDED
Status: DISCONTINUED | OUTPATIENT
Start: 2020-03-16 | End: 2020-03-16 | Stop reason: SURG

## 2020-03-16 RX ORDER — SODIUM CHLORIDE, SODIUM LACTATE, POTASSIUM CHLORIDE, CALCIUM CHLORIDE 600; 310; 30; 20 MG/100ML; MG/100ML; MG/100ML; MG/100ML
INJECTION, SOLUTION INTRAVENOUS CONTINUOUS PRN
Status: DISCONTINUED | OUTPATIENT
Start: 2020-03-16 | End: 2020-03-16 | Stop reason: SURG

## 2020-03-16 RX ORDER — PROPOFOL 10 MG/ML
INJECTION, EMULSION INTRAVENOUS AS NEEDED
Status: DISCONTINUED | OUTPATIENT
Start: 2020-03-16 | End: 2020-03-16 | Stop reason: SURG

## 2020-03-16 RX ORDER — PANTOPRAZOLE SODIUM 40 MG/1
40 TABLET, DELAYED RELEASE ORAL EVERY MORNING
Qty: 90 TABLET | Refills: 1 | Status: SHIPPED | OUTPATIENT
Start: 2020-03-16

## 2020-03-16 RX ADMIN — PROPOFOL 50 MG: 10 INJECTION, EMULSION INTRAVENOUS at 10:47

## 2020-03-16 RX ADMIN — PROPOFOL 20 MG: 10 INJECTION, EMULSION INTRAVENOUS at 10:57

## 2020-03-16 RX ADMIN — PROPOFOL 50 MG: 10 INJECTION, EMULSION INTRAVENOUS at 10:51

## 2020-03-16 RX ADMIN — PROPOFOL 20 MG: 10 INJECTION, EMULSION INTRAVENOUS at 10:54

## 2020-03-16 RX ADMIN — LIDOCAINE HYDROCHLORIDE 50 MG: 10 INJECTION, SOLUTION EPIDURAL; INFILTRATION; INTRACAUDAL; PERINEURAL at 10:44

## 2020-03-16 RX ADMIN — PROPOFOL 150 MG: 10 INJECTION, EMULSION INTRAVENOUS at 10:44

## 2020-03-16 RX ADMIN — SODIUM CHLORIDE, SODIUM LACTATE, POTASSIUM CHLORIDE, AND CALCIUM CHLORIDE: .6; .31; .03; .02 INJECTION, SOLUTION INTRAVENOUS at 10:24

## 2020-03-16 NOTE — INTERVAL H&P NOTE
H&P reviewed  After examining the patient I find no changes in the patients condition since the H&P had been written      Vitals:    03/16/20 1002   Pulse: 55   Resp: 16   Temp: 97 6 °F (36 4 °C)   SpO2: 99%

## 2020-03-16 NOTE — DISCHARGE INSTRUCTIONS
Upper Endoscopy   WHAT YOU NEED TO KNOW:   An upper endoscopy is also called an upper gastrointestinal (GI) endoscopy, or an esophagogastroduodenoscopy (EGD)  You may feel bloated, gassy, or have some abdominal discomfort after your procedure  Your throat may be sore for 24 to 36 hours  You may burp or pass gas from air that is still inside your body  DISCHARGE INSTRUCTIONS:   Call 911 if:   · You have sudden chest pain or trouble breathing  Seek care immediately if:   · You feel dizzy or faint  · You have trouble swallowing  · You have severe throat pain  · Your bowel movements are very dark or black  · Your abdomen is hard and firm and you have severe pain  · You vomit blood  Contact your healthcare provider if:   · You feel full or bloated and cannot burp or pass gas  · You have not had a bowel movement for 3 days after your procedure  · You have neck pain  · You have a fever or chills  · You have nausea or are vomiting  · You have a rash or hives  · You have questions or concerns about your endoscopy  Relieve a sore throat:  Suck on throat lozenges or crushed ice  Gargle with a small amount of warm salt water  Mix 1 teaspoon of salt and 1 cup of warm water to make salt water  Relieve gas and discomfort from bloating:  Lie on your right side with a heating pad on your abdomen  Take short walks to help pass gas  Eat small meals until bloating is relieved  Rest after your procedure:  Do not drive or make important decisions until the day after your procedure  Return to your normal activity as directed  You can usually return to work the day after your procedure  Follow up with your healthcare provider as directed:  Write down your questions so you remember to ask them during your visits  © 2017 [employer]  Information is for End User's use only and may not be sold, redistributed or otherwise used for commercial purposes   All illustrations and images included in [employer] are the copyrighted property of [employer] , [employer]  or [employer]  The above information is an  only  It is not intended as medical advice for individual conditions or treatments  Talk to your doctor, nurse or pharmacist before following any medical regimen to see if it is safe and effective for you

## 2020-03-16 NOTE — ANESTHESIA PREPROCEDURE EVALUATION
Review of Systems/Medical History  Patient summary reviewed  Chart reviewed  No history of anesthetic complications     Cardiovascular  Exercise tolerance (METS): >4,     Pulmonary       GI/Hepatic            Endo/Other     GYN       Hematology   Musculoskeletal       Neurology   Psychology           Physical Exam    Airway    Mallampati score: III  TM Distance: >3 FB  Neck ROM: full     Dental   No notable dental hx     Cardiovascular      Pulmonary      Other Findings        Anesthesia Plan  ASA Score- 2     Anesthesia Type- IV sedation with anesthesia with ASA Monitors  Additional Monitors:   Airway Plan:         Plan Factors-    Induction- intravenous  Postoperative Plan-     Informed Consent- Anesthetic plan and risks discussed with patient  I personally reviewed this patient with the CRNA  Discussed and agreed on the Anesthesia Plan with the CRNA  Darletta Pac

## 2020-03-19 ENCOUNTER — TELEPHONE (OUTPATIENT)
Dept: GASTROENTEROLOGY | Facility: CLINIC | Age: 50
End: 2020-03-19

## 2020-03-19 DIAGNOSIS — A04.8 H. PYLORI INFECTION: Primary | ICD-10-CM

## 2020-03-19 RX ORDER — AMOXICILLIN 500 MG/1
1000 CAPSULE ORAL 2 TIMES DAILY
Qty: 56 CAPSULE | Refills: 0 | Status: SHIPPED | OUTPATIENT
Start: 2020-03-19 | End: 2020-04-02

## 2020-03-19 RX ORDER — CLARITHROMYCIN 500 MG/1
500 TABLET, COATED ORAL 2 TIMES DAILY
Qty: 28 TABLET | Refills: 0 | Status: SHIPPED | OUTPATIENT
Start: 2020-03-19 | End: 2020-04-02

## 2020-03-19 NOTE — TELEPHONE ENCOUNTER
Left message on machine to him for him to call me back to go over results    He is H pylori positive

## 2020-03-19 NOTE — TELEPHONE ENCOUNTER
Patient's results given, he has H pylori  Please call in  Amoxicillin 500 mg, 2 tabs p o  B i d  x2 weeks  Biaxin 500 mg p o  B i d  X2 weeks  I told him to increase his Protonix to b i d  X2 weeks  Stool 1 month after treatment for H pylori antigen

## 2020-04-13 DIAGNOSIS — K52.9 COLITIS: ICD-10-CM

## 2020-04-13 RX ORDER — DICYCLOMINE HCL 20 MG
TABLET ORAL
Qty: 180 TABLET | Refills: 1 | Status: SHIPPED | OUTPATIENT
Start: 2020-04-13

## 2025-01-16 ENCOUNTER — TELEPHONE (OUTPATIENT)
Age: 55
End: 2025-01-16